# Patient Record
Sex: FEMALE | Race: WHITE | Employment: OTHER | URBAN - METROPOLITAN AREA
[De-identification: names, ages, dates, MRNs, and addresses within clinical notes are randomized per-mention and may not be internally consistent; named-entity substitution may affect disease eponyms.]

---

## 2020-07-27 ENCOUNTER — TELEPHONE (OUTPATIENT)
Dept: WOUND CARE | Age: 85
End: 2020-07-27

## 2020-07-28 ENCOUNTER — HOSPITAL ENCOUNTER (OUTPATIENT)
Dept: WOUND CARE | Age: 85
Discharge: HOME | End: 2020-07-28
Payer: MEDICARE

## 2020-07-28 VITALS
SYSTOLIC BLOOD PRESSURE: 192 MMHG | HEART RATE: 83 BPM | DIASTOLIC BLOOD PRESSURE: 91 MMHG | RESPIRATION RATE: 18 BRPM | TEMPERATURE: 97.7 F

## 2020-07-28 PROBLEM — L89.224: Status: ACTIVE | Noted: 2020-07-28

## 2020-07-28 PROBLEM — L89.152 PRESSURE ULCER OF SACRAL REGION, STAGE 2 (HCC): Status: ACTIVE | Noted: 2020-07-28

## 2020-07-28 PROBLEM — R64 CACHEXIA (HCC): Status: ACTIVE | Noted: 2020-07-28

## 2020-07-28 PROBLEM — F03.90 DEMENTIA (HCC): Status: ACTIVE | Noted: 2020-07-28

## 2020-07-28 PROCEDURE — 99204 OFFICE O/P NEW MOD 45 MIN: CPT

## 2020-07-28 PROCEDURE — 11043 DBRDMT MUSC&/FSCA 1ST 20/<: CPT

## 2020-07-28 RX ORDER — METOPROLOL SUCCINATE 100 MG/1
100 TABLET, EXTENDED RELEASE ORAL DAILY
COMMUNITY

## 2020-07-28 NOTE — PROGRESS NOTES
07/28/20 1111   Wound Hip Left 1   Date First Assessed/Time First Assessed: 07/28/20 1110   Present on Hospital Admission: Yes  Wound Approximate Age at First Assessment (Weeks): 6 weeks  Primary Wound Type: Pressure Injury  Location: Hip  Wound Location Orientation: Left  Wound Descri. ..    Dressing Status Removed   Dressing Type 4 x 4;Other (Comment)  (santyl, tape)   Pressure Injury Stage 4   Wound Length (cm) 2.6 cm   Wound Width (cm) 4 cm   Wound Depth (cm) 1.2 cm   Wound Surface Area (cm^2) 10.4 cm^2   Wound Volume (cm^3) 12.48 cm^3   Condition of Base Slough;Pink   Condition of Edges Open   Tunneling (cm) 4.8 cm   Direction of Tunnel 9 o'clock   Undermining (cm) 1.5 cm   Direction of Undermining 12 o'clock;1 o'clock   Drainage Amount Moderate   Drainage Color Purulent   Wound Odor Foul   Katie-wound Assessment Blanchable erythema   Cleansing and Cleansing Agents  Normal saline   Wound Sacrum Mid   Date First Assessed/Time First Assessed: 07/28/20 1114   Present on Hospital Admission: Yes  Wound Approximate Age at First Assessment (Weeks): 6 weeks  Primary Wound Type: Pressure Injury  Location: Sacrum  Wound Location Orientation: Mid   Dressing Status Removed   Dressing Type Foam   Wound Length (cm) 1.7 cm   Wound Width (cm) 0.9 cm   Wound Depth (cm) 0.2 cm   Wound Surface Area (cm^2) 1.53 cm^2   Wound Volume (cm^3) 0.31 cm^3   Condition of Base Slough   Condition of Edges Open   Drainage Amount Small   Drainage Color Serosanguinous   Wound Odor None   Katie-wound Assessment Blanchable erythema     Visit Vitals  BP (!) 192/91   Pulse 83   Temp 97.7 °F (36.5 °C)   Resp 18

## 2020-07-28 NOTE — PROGRESS NOTES
Past Medical History:   Diagnosis Date    Cancer ([facility] [address]     Dementia     Hypertension     Ulcer disease        Past Surgical History:   Procedure Laterality Date    CHEST SURGERY PROCEDURE UNLISTED      HX GI         No family history on file. Social History     Tobacco Use    Smoking status: Never Smoker   Substance Use Topics    Alcohol use: No    Drug use: No       Allergies   Allergen Reactions    Pcn [Penicillins] Unknown (comments)       Current Outpatient Medications on File Prior to Encounter   Medication Sig Dispense Refill    metoprolol succinate (TOPROL-XL) 100 mg tablet Take 100 mg by mouth daily.  memantine (NAMENDA) 5 mg tablet Take 5 mg by mouth two (2) times a day.  lisinopril (PRINIVIL, ZESTRIL) 10 mg tablet Take 10 mg by mouth daily.  PARoxetine (PAXIL) 10 mg tablet Take 10 mg by mouth daily.  bisoprolol (ZEBETA) 5 mg tablet Take 5 mg by mouth daily.  HYDROcodone-acetaminophen (NORCO) 5-325 mg per tablet Take 1 Tab by mouth every four (4) hours as needed for Pain. 20 Tab 0     No current facility-administered medications on file prior to encounter. No components found for: LABA1C    . Denise Cedillo RN    Registered Nurse       Progress Notes    Signed    Date of Service:  07/28/20 1121                     []Hide copied text    []Mario for details      07/28/20 1111   Wound Hip Left 1   Date First Assessed/Time First Assessed: 07/28/20 1110   Present on Hospital Admission: Yes  Wound Approximate Age at First Assessment (Weeks): 6 weeks  Primary Wound Type: Pressure Injury  Location: Hip  Wound Location Orientation: Left  Wound Descri. ..    Dressing Status Removed   Dressing Type 4 x 4;Other (Comment)  (santyl, tape)   Pressure Injury Stage 4   Wound Length (cm) 2.6 cm   Wound Width (cm) 4 cm   Wound Depth (cm) 1.2 cm   Wound Surface Area (cm^2) 10.4 cm^2   Wound Volume (cm^3) 12.48 cm^3   Condition of Base Slough;Pink   Condition of Edges Open   Tunneling (cm) 4.8 cm   Direction of Tunnel 9 o'clock   Undermining (cm) 1.5 cm   Direction of Undermining 12 o'clock;1 o'clock   Drainage Amount Moderate   Drainage Color Purulent   Wound Odor Foul   Katie-wound Assessment Blanchable erythema   Cleansing and Cleansing Agents  Normal saline   Wound Sacrum Mid   Date First Assessed/Time First Assessed: 07/28/20 1114   Present on Hospital Admission: Yes  Wound Approximate Age at First Assessment (Weeks): 6 weeks  Primary Wound Type: Pressure Injury  Location: Sacrum  Wound Location Orientation: Mid   Dressing Status Removed   Dressing Type Foam   Wound Length (cm) 1.7 cm   Wound Width (cm) 0.9 cm   Wound Depth (cm) 0.2 cm   Wound Surface Area (cm^2) 1.53 cm^2   Wound Volume (cm^3) 0.31 cm^3   Condition of Base Slough   Condition of Edges Open   Drainage Amount Small   Drainage Color Serosanguinous   Wound Odor None   Katie-wound Assessment Blanchable erythema     Visit Vitals  BP (!) 192/91   Pulse 83   Temp 97.7 °F (36.5 °C)   Resp 18                            Chief Complaint (CC): 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 Present Illness (PI): patient with progressive dementia recently in hospital and developed pressure ulcer in pelvis 6 weeks ago. Son cares for her now at home, with Air floatation mattress, chair cushion, home health with Santyl dressings. .  Pertinent Past History (PMedHx): as above. Also noted:                         Medications and Allergies: as per [facility]. I have reviewed and concur. Illnesses: as per '[address] [place] [id number]' recorded data noted today. Surgeries and Injuries: as per '[address] [place] [id number]' recorded data noted today. .    Review of Systems (ROS):                        Integumentary: Other than as noted in 'PI'; skin hair and nails normal for age, with no new rash, lumps, bumps, eruptions or bleeding.                          Lymph: no new prominent nodes or drainage near lymph nodes. Bones, Joints, and Muscles: Other than as noted in 'PI' no new fractures, dislocations, weakness or pain. .                        Hematopoietic: no new bleeding or bruising or anemia changes. .                        Eyes: no recent trauma or inflammation. no. Eye glasses. no. Intra Occular Lens Implants (IOLI)                        Ears: Hearing is unchanged and usually good. Nose: no new drainage, rhinorhea or epistaxis. Mouth, and throat: no soreness, drainage or lesions. no. Dentures. Neck: no new masses, drainage or pain                        Respiratory; no hemoptysis, current shortness of breath or pain with breath. Cardiovascular: No chest pain, palpitation or tachycardia. .                        Gastrointestinal: no recent change in appetite, stools or food tolerance. No jaundice, hematemesis, vomiting or diarrhea                        Genito-Urinary: urine color, frequency, sensation unchanged                        Neurologic: no syncope, dizzyness or unusual sensations. Psychologic and Mental Status: no change in mood, sleep or memory recently     Social History: '[facility]' data today is noted. Lives at home, education through elementary  Family History: '[facility]' data today is noted. Clarita Pedraza Physical Exam:      General:  easily aroused laying on R side in fetal posture, eyes closed, very thin, well kempt. Head, Eyes, Ears, Nose and Throat: normocephalic, PERRLA EOMI, Tms with thick cerumen over mucosa some what dry with food particles between teeth. Neck: supple without masses or adenopathy. Thin no bruit. Chest: full excursion without deformity, . Lungs: clear distant sounds. Heart: RSR no M. Abdomen: thin scaphoid non tender. Neurology: Cranial nerves 2-12 grossly intact does speak incoherent.           Reflexes: BJ, CBJ, Shannan Duran 2+ and =  Vascular:                         Pulses:                                            R                    L                                               Radial                       +++.                 +++. Femoral                     ++. ++.                                              DP                             +.                 +.                                              PT                             +.                 +.  Extremities: thin resists movement but can move all joints. .  Other: L greater troch ulcer through fascia in bone surface in some areas, much undermining, but little slough, loose tendon fibers  Presacral ulcer through subQ with slough over all surface. Vital signs and data recorded in '[facility]' for this patient today is noted, reviewed and considered. Patient notes today: no spontaneous complaints, fusses with any movement. Procedure Note     Name of Procedure: sharp excisional debridement. PreOp diagnosis: pressure ulcers. .  Anaesthesia: Lidocaine; topical   Description: using sharp scissors, and a sharp #7 curette I excised all non viable tissue to effect a clean bleeding base. Tissue level of debridement: bone. Post debridement dimensions changed as noted:    Depth, add 1.0 mm;    Width, add 0.0 mm   Length, add 0.0 mm. Blood Loss: 3. CCs. Bleeding abated post treatment . Post Op Diagnosis, and condition: anxious, no pain with procedure just movement. Follow Up Plan: RTC 2 weeks, wound vac, glory to ulcer bases, aquacel over, when wound for troch applied will start glory only, after baby shampoo cleanse. Specimens: 0. Counseled regarding/Discussed: as above, pretty good wound enzyme debridement. Clinical considerations: alert to best off loading, good pressure map, avoid infetion. Prognosis: will need wound for 6+ week. s.    Dx Codes: O21.382, F09.297.

## 2020-07-28 NOTE — WOUND CARE
07/28/20 1217 Wound Hip Left 1 Date First Assessed/Time First Assessed: 07/28/20 1110   Present on Hospital Admission: Yes  Wound Approximate Age at First Assessment (Weeks): 6 weeks  Primary Wound Type: Pressure Injury  Location: Hip  Wound Location Orientation: Left  Wound Descri. .. Dressing Changed Changed/New Dressing Type Applied ABD pad; Other (Comment) (Kourtney, Aquacel) Wound Sacrum Mid Date First Assessed/Time First Assessed: 07/28/20 1114   Present on Hospital Admission: Yes  Wound Approximate Age at First Assessment (Weeks): 6 weeks  Primary Wound Type: Pressure Injury  Location: Sacrum  Wound Location Orientation: Mid Cleansing and Cleansing Agents  Normal saline Dressing Changed Changed/New Dressing Type Applied ABD pad; Other (Comment) (Kourtney, aquacel) Discharge Condition: Stable Pain: 0 Ambulatory Status: Wheelchair Discharge Destination: Home Transportation: Car Accompanied by: Family/Caregiver Discharge instructions reviewed with Family/Caregiver  and copy or written instructions have been provided. All questions/concerns have been addressed at this time.

## 2020-08-05 NOTE — DISCHARGE INSTRUCTIONS
Discharge Instructions for  [address], [address] [place] [address] Avenue  Telephone: [phone]    NAME:  Franko Lea  YOB: 1930  MEDICAL RECORD NUMBER:  [id number]  DATE:  7/28/2020     Dressings:           Wound Location sacrum  Cleanse with baby shampoo let sit for 3 minutes  Then rinse with NS. Apply window pane with white foam.  Apply glory to wound bed and aquacel ag over. Then apply  foam border dressing. Change every other day. Dressings:           Wound Location left hip   Cleanse with baby shampoo let sit for 3 minutes  Then rinse with NS. Apply glory to wound bed and then cover with aquacel ag. Cover with ABD and tape. Change every other day. Apply wound vac when available at 125 continuous. Apply Primsa to wound bed,  Pack with white foam and cover with black foam.   Change wound vac  every Monday and Friday. Pressure Relief:  [x] When sitting, shift position or do seat lifts every 15 minutes. [x] Turn every 2 hours when in bed. Avoid position directing pressure on wound site. Limit side lying to 30 degree tilt. Limit HOB elevation to 30 degrees. Dietary:  [x] Diet as tolerated: [x] Increase Protein: Eggs, cheese, cottage cheese, fish, meat, high protein ensure      Activity:  [x] Activity as tolerated:              Return Appointment[de-identified]   [x] ECF or Home Healthcare: [facility]   [x] Return Appointment: With [employer]   in  [facility])     Electronically signed on 7/28/2020 at 11:28 AM Remi Conner Information: Should you experience any significant changes in your wound(s) or have questions about your wound care, please contact the [facility] at [address] [place] [address] 8:00 am - 4:30. If you need help with your wound outside these hours and cannot wait until we are again available, contact your PCP or go to the hospital emergency room.      PLEASE NOTE: IF YOU ARE UNABLE TO OBTAIN WOUND SUPPLIES, CONTINUE TO USE THE SUPPLIES YOU HAVE AVAILABLE UNTIL YOU ARE ABLE TO REACH US. IT IS MOST IMPORTANT TO KEEP THE WOUND COVERED AT ALL TIMES.       Physician Signature:_______________________    Date: ___________ Time:  ____________

## 2020-08-10 ENCOUNTER — TELEPHONE (OUTPATIENT)
Dept: WOUND CARE | Age: 85
End: 2020-08-10

## 2020-08-10 NOTE — TELEPHONE ENCOUNTER
New Patient Appointment Reminder and [phone] Screening     Have you or anyone in your house hold been tested for or have had confirmed positive Covid-19 test or suspected of or have you been around anyone that has had confirmed or suspected Covid-19? No     Does Patient have fever and/or lower respiratory symptoms? (shortness of breath, difficulty breathing, cough) If yes continue with travel screening questions and cancel patient appointment, and refer to to PCP or ED. No     Referred to PCP or to the closest ED and notified ED of pending patient arrival:  No   Open travel questions and document patient responses. If No stop here and give patient reminder time for appointment and ask them please limit to having only 1 person accompany to appointment if necessary, no children allowed at visits. Remind all patients and caretakers they must wear a mask when entering into the wound clinic. Reminder Appointment time given: Yes   Due to your recent history and reported symptoms, we ask that you go to an emergency  department for further evaluation.  You should wear a facemask when you are in the same room with other people and  when you visit a health care provider. If you cannot wear a facemask, other practical  means of protection can be used, such as a wash cloth.  Cover your mouth and nose with a tissue when you cough or sneeze, or you can  cough or sneeze into your sleeve. Throw used tissues away, and immediately wash  your hands with soap and water for at least 20 seconds.  Wash your hands often and thoroughly with soap and water for at least 20 seconds. You can use an alcohol-based hand , if soap and water are not available.

## 2020-08-11 ENCOUNTER — HOSPITAL ENCOUNTER (OUTPATIENT)
Dept: GENERAL RADIOLOGY | Age: 85
Discharge: HOME | End: 2020-08-11
Attending: EMERGENCY MEDICINE
Payer: MEDICARE

## 2020-08-11 ENCOUNTER — HOSPITAL ENCOUNTER (OUTPATIENT)
Dept: WOUND CARE | Age: 85
Discharge: HOME | End: 2020-08-11
Payer: MEDICARE

## 2020-08-11 VITALS
DIASTOLIC BLOOD PRESSURE: 76 MMHG | SYSTOLIC BLOOD PRESSURE: 144 MMHG | HEART RATE: 103 BPM | TEMPERATURE: 97.7 F | RESPIRATION RATE: 18 BRPM

## 2020-08-11 DIAGNOSIS — M65.9 SAPHO SYNDROME (HCC): ICD-10-CM

## 2020-08-11 DIAGNOSIS — L40.3 SAPHO SYNDROME (HCC): ICD-10-CM

## 2020-08-11 DIAGNOSIS — M85.80 SAPHO SYNDROME (HCC): ICD-10-CM

## 2020-08-11 DIAGNOSIS — L70.9 SAPHO SYNDROME (HCC): ICD-10-CM

## 2020-08-11 DIAGNOSIS — M86.9 SAPHO SYNDROME (HCC): ICD-10-CM

## 2020-08-11 PROCEDURE — 72170 X-RAY EXAM OF PELVIS: CPT

## 2020-08-11 PROCEDURE — 11043 DBRDMT MUSC&/FSCA 1ST 20/<: CPT

## 2020-08-11 NOTE — WOUND CARE
08/11/20 1346   Wound Hip Left 1   Date First Assessed/Time First Assessed: 07/28/20 1110   Present on Hospital Admission: Yes  Wound Approximate Age at First Assessment (Weeks): 6 weeks  Primary Wound Type: Pressure Injury  Location: Hip  Wound Location Orientation: Left  Wound Descri. .. Dressing Status Removed   Dressing Type Negative pressure wound therapy   Non-staged Wound Description Full thickness   Pressure Injury Stage 4   Wound Length (cm) 2.7 cm   Wound Width (cm) 3.6 cm   Wound Depth (cm) 0.6 cm   Wound Surface Area (cm^2) 9.72 cm^2   Wound Volume (cm^3) 5.83 cm^3   Change in Wound Size % 6.54   Condition of Base Fascia exposed   Condition of Edges Rolled/curled   Undermining (cm) 7.1 cm  (1.6 circumferential)   Direction of Undermining 7 o'clock;8 o'clock;9 o'clock   Drainage Amount Large   Drainage Color Serosanguinous   Wound Odor Strong   Katie-wound Assessment [place]   Wound Sacrum Mid   Date First Assessed/Time First Assessed: 07/28/20 1114   Present on Hospital Admission: Yes  Wound Approximate Age at First Assessment (Weeks): 6 weeks  Primary Wound Type: Pressure Injury  Location: Sacrum  Wound Location Orientation: Mid   Dressing Status Removed   Dressing Type Aquacel; Foam   Non-staged Wound Description Full thickness   Wound Length (cm) 1 cm   Wound Width (cm) 0.7 cm   Wound Depth (cm) 0.3 cm   Wound Surface Area (cm^2) 0.7 cm^2   Wound Volume (cm^3) 0.21 cm^3   Change in Wound Size % 54.25   Condition of Base Pink;Slough   Condition of Edges Open   Drainage Amount Moderate   Drainage Color Serosanguinous   Wound Odor None   Katie-wound Assessment Pink     Visit Vitals  /76 (BP 1 Location: Left arm, BP Patient Position: Lying right side)   Pulse (!) 103   Temp 97.7 °F (36.5 °C)   Resp 18

## 2020-08-11 NOTE — PROGRESS NOTES
Shruti Nelson RN    Registered Nurse    Wound Care    Wound Care    Signed    Date of Service:  08/11/20 1330                     []Hide copied text    []Mario for details      08/11/20 1346   Wound Hip Left 1   Date First Assessed/Time First Assessed: 07/28/20 1110   Present on Hospital Admission: Yes  Wound Approximate Age at First Assessment (Weeks): 6 weeks  Primary Wound Type: Pressure Injury  Location: Hip  Wound Location Orientation: Left  Wound Descri. .. Dressing Status Removed   Dressing Type Negative pressure wound therapy   Non-staged Wound Description Full thickness   Pressure Injury Stage 4   Wound Length (cm) 2.7 cm   Wound Width (cm) 3.6 cm   Wound Depth (cm) 0.6 cm   Wound Surface Area (cm^2) 9.72 cm^2   Wound Volume (cm^3) 5.83 cm^3   Change in Wound Size % 6.54   Condition of Base Fascia exposed   Condition of Edges Rolled/curled   Undermining (cm) 7.1 cm  (1.6 circumferential)   Direction of Undermining 7 o'clock;8 o'clock;9 o'clock   Drainage Amount Large   Drainage Color Serosanguinous   Wound Odor Strong   Katie-wound Assessment [place]   Wound Sacrum Mid   Date First Assessed/Time First Assessed: 07/28/20 1114   Present on Hospital Admission: Yes  Wound Approximate Age at First Assessment (Weeks): 6 weeks  Primary Wound Type: Pressure Injury  Location: Sacrum  Wound Location Orientation: Mid   Dressing Status Removed   Dressing Type Aquacel; Foam   Non-staged Wound Description Full thickness   Wound Length (cm) 1 cm   Wound Width (cm) 0.7 cm   Wound Depth (cm) 0.3 cm   Wound Surface Area (cm^2) 0.7 cm^2   Wound Volume (cm^3) 0.21 cm^3   Change in Wound Size % 54.25   Condition of Base Pink;Slough   Condition of Edges Open   Drainage Amount Moderate   Drainage Color Serosanguinous   Wound Odor None   Katie-wound Assessment Pink     Visit Vitals  /76 (BP 1 Location: Left arm, BP Patient Position: Lying right side)   Pulse (!) 103   Temp 97.7 °F (36.5 °C)   Resp 18                 I have noted, and reviewed today's data for this patient in [facility] and concur with same. The focused physical exam, other physical findings, Medical history, Review of Symptoms and Medications today remains unchanged except as noted below. Patient notes today: little difference, nothing new of concern, wound vac, putting out a lot of liquid. Lesion/Wound, focused exam on Presentation today: R trochanter, increased undermining, slough over all deep surface, loose fascial fibers, bone surface exposed anterior edges  Sacral ulcer small, slough over ulcer bed. Procedure:   Wound # pressure ulcers. Procedure name: sharp excisional debridement. Anaesthesia: Lidocaine; topical    Description: using a sharp #7 curette I excised all non viable tissue to effect a clean bleeding base. Tissue Level/depth of debridement: fascia. Post debridement dimensions changed as noted:    Depth, add 1.0 mm;    Width, add 0.0 mm   Length, add 0.0 mm. Blood Loss: 4 CCs. Bleeding abated post treatment . Post Procedure Condition/ Diagnosis: little progress, note lab with low albumin, now with protien/calory malnutrition. Follow up and Future plans today: RTC 2 weeks, discuss Hospice, plan xray hip and pelvis, . Specimens: 0. Patient Counseled regarding/Discussed: as above, son does understand that with malnutrition the wound won't heal, and that this is now more and end stage, supportive care goal.  Clinical Considerations: alert to infection, nutrition, adequate supportive care. Dx Codes: C77.475; V36.332.

## 2020-08-11 NOTE — WOUND CARE
08/11/20 1539   Wound Hip Left 1   Date First Assessed/Time First Assessed: 07/28/20 1110   Present on Hospital Admission: Yes  Wound Approximate Age at First Assessment (Weeks): 6 weeks  Primary Wound Type: Pressure Injury  Location: Hip  Wound Location Orientation: Left  Wound Descri. .. Dressing Changed Changed/New   Dressing Type Applied Negative pressure wound therapy  (Kourtney to base, 1 white foam, 2 black foams)   Wound Sacrum Mid   Date First Assessed/Time First Assessed: 07/28/20 1114   Present on Hospital Admission: Yes  Wound Approximate Age at First Assessment (Weeks): 6 weeks  Primary Wound Type: Pressure Injury  Location: Sacrum  Wound Location Orientation: Mid   Cleansing and Cleansing Agents  Normal saline   Dressing Changed Changed/New   Dressing Type Applied Other (Comment)  (Kourtney, aquacel, window pane, foam.)   Negative Pressure    NAME:  Monica Taylor  YOB: 1929  MEDICAL RECORD NUMBER:  [id number]  DATE:  8/11/2020     Applied Negative Pressure to left hip wound(s)/ulcer(s).  [x] Applied skin barrier prep to jodi-wound.  [x] Cut strips of plastic drape to picture frame wound so that jodi-wound is     covered with the drape.  [x] If bridging dressing to less prominent site, cover any intact skin that will come in contact with the Negative Pressure Therapy sponge, gauze or channel drain with plastic drape. The sponge should never touch intact skin.  [x] Cut sponge, gauze or channel drain to size which will fit into the wound/ulcer bed without being forced.  [x] Be sure the sponge is large enough to hold the entire round plastic flange which is attached to the tubing. Never allow flange to be larger than the sponge or it will produce suction damaging intact skin.    Total number of individual pieces of foam used within the wound bed: 2   [x] If bridging the dressing away from the primary site, be sure the bridge leads to a piece of sponge large enough to hold the entire flange without allowing any of the flange to overlap onto intact skin.  [x] Covered sponge, gauze or channel drain with plastic drape.  [x] Cut a hole in this plastic drape directly over the sponge the same size as the plastic drain tubing.  [x] Removed plastic liner from flange and apply it directly over the hole you cut.  [x] Removed the plastic cover from the flange.  [x] Attached the tubing to the wound/ulcer Negative Pressure Therapy and turn it on to be sure a vacuum is created and that there are no leaks.  [x] If air leaks occur, use plastic drape to patch them.  [] Secured Negative Pressure Therapy dressing with ace wrap loosely if located on an extremity. Maintain tubing outside of ace wrap. Tubing must not exert pressure on intact skin.     Applied per  Guidelines      Electronically signed by Delfin Troy on 8/11/2020 at 3:46 PM

## 2020-08-11 NOTE — DISCHARGE INSTRUCTIONS
Discharge Instructions for  [address], [address] [place] [address] Avenue  Telephone: [phone]    NAME:  Konnie Duverney  YOB: 1930  MEDICAL RECORD NUMBER:  [id number]  DATE:  08/11/2020     Dressings:           Wound Location sacrum  Cleanse with baby shampoo let sit for 3 minutes  Then rinse with NS. Apply window pane with white foam.  Apply glory to wound bed and aquacel ag over. Then apply  foam border dressing. Change every other day. Dressings:           Wound Location left hip   Cleanse with baby shampoo let sit for 3 minutes  Then rinse with NS. Apply wound vac when available at 125 continuous. Apply Primsa to wound bed,  Pack with white foam and cover with black foam.   Change wound vac  every Monday and Friday. Pressure Relief:  [x] When sitting, shift position or do seat lifts every 15 minutes. [x] Turn every 2 hours when in bed. Avoid position directing pressure on wound site. Limit side lying to 30 degree tilt. Limit HOB elevation to 30 degrees. Dietary:  [x] Diet as tolerated: [x] Increase Protein: Eggs, cheese, cottage cheese, fish, meat, high protein ensure      Activity:  [x] Activity as tolerated:              Return Appointment[de-identified]   [x] ECF or Home Healthcare: [facility]   [x] Return Appointment: With [facility]   in  2 Week(s)  Xray of left hip. Hospice Consult. Electronically signed on 08/11/2020 at 02:08 PM Wilbur Farrell, [address] Information: Should you experience any significant changes in your wound(s) or have questions about your wound care, please contact the [facility] at [address] [place] [address] 8:00 am - 4:30. If you need help with your wound outside these hours and cannot wait until we are again available, contact your PCP or go to the hospital emergency room.    PLEASE NOTE: IF YOU ARE UNABLE TO OBTAIN WOUND SUPPLIES, CONTINUE TO USE THE SUPPLIES YOU HAVE AVAILABLE UNTIL YOU ARE ABLE TO REACH US. IT IS MOST IMPORTANT TO KEEP THE WOUND COVERED AT ALL TIMES.       Physician Signature:_______________________    Date: ___________ Time:  ____________

## 2020-08-14 ENCOUNTER — HOSPICE ADMISSION (OUTPATIENT)
Dept: HOSPICE | Facility: HOSPICE | Age: 85
End: 2020-08-14

## 2020-08-25 ENCOUNTER — HOSPITAL ENCOUNTER (OUTPATIENT)
Dept: WOUND CARE | Age: 85
Discharge: HOME | End: 2020-08-25